# Patient Record
Sex: MALE | Race: OTHER | NOT HISPANIC OR LATINO
[De-identification: names, ages, dates, MRNs, and addresses within clinical notes are randomized per-mention and may not be internally consistent; named-entity substitution may affect disease eponyms.]

---

## 2023-06-25 ENCOUNTER — TRANSCRIPTION ENCOUNTER (OUTPATIENT)
Age: 33
End: 2023-06-25

## 2023-06-25 ENCOUNTER — INPATIENT (INPATIENT)
Facility: HOSPITAL | Age: 33
LOS: 3 days | Discharge: HOME | DRG: 336 | End: 2023-06-29
Attending: STUDENT IN AN ORGANIZED HEALTH CARE EDUCATION/TRAINING PROGRAM | Admitting: STUDENT IN AN ORGANIZED HEALTH CARE EDUCATION/TRAINING PROGRAM
Payer: COMMERCIAL

## 2023-06-25 VITALS
WEIGHT: 255.07 LBS | RESPIRATION RATE: 20 BRPM | SYSTOLIC BLOOD PRESSURE: 106 MMHG | DIASTOLIC BLOOD PRESSURE: 64 MMHG | HEIGHT: 76 IN | HEART RATE: 95 BPM | OXYGEN SATURATION: 99 % | TEMPERATURE: 99 F

## 2023-06-25 LAB
ALBUMIN SERPL ELPH-MCNC: 4.5 G/DL — SIGNIFICANT CHANGE UP (ref 3.3–5)
ALP SERPL-CCNC: 53 U/L — SIGNIFICANT CHANGE UP (ref 40–120)
ALT FLD-CCNC: 28 U/L — SIGNIFICANT CHANGE UP (ref 10–45)
ANION GAP SERPL CALC-SCNC: 11 MMOL/L — SIGNIFICANT CHANGE UP (ref 5–17)
APTT BLD: 27.6 SEC — SIGNIFICANT CHANGE UP (ref 27.5–35.5)
AST SERPL-CCNC: 21 U/L — SIGNIFICANT CHANGE UP (ref 10–40)
BASOPHILS # BLD AUTO: 0.02 K/UL — SIGNIFICANT CHANGE UP (ref 0–0.2)
BASOPHILS NFR BLD AUTO: 0.1 % — SIGNIFICANT CHANGE UP (ref 0–2)
BILIRUB SERPL-MCNC: 0.6 MG/DL — SIGNIFICANT CHANGE UP (ref 0.2–1.2)
BUN SERPL-MCNC: 12 MG/DL — SIGNIFICANT CHANGE UP (ref 7–23)
CALCIUM SERPL-MCNC: 10 MG/DL — SIGNIFICANT CHANGE UP (ref 8.4–10.5)
CHLORIDE SERPL-SCNC: 100 MMOL/L — SIGNIFICANT CHANGE UP (ref 96–108)
CO2 SERPL-SCNC: 26 MMOL/L — SIGNIFICANT CHANGE UP (ref 22–31)
CREAT SERPL-MCNC: 1.1 MG/DL — SIGNIFICANT CHANGE UP (ref 0.5–1.3)
EGFR: 91 ML/MIN/1.73M2 — SIGNIFICANT CHANGE UP
EOSINOPHIL # BLD AUTO: 0.01 K/UL — SIGNIFICANT CHANGE UP (ref 0–0.5)
EOSINOPHIL NFR BLD AUTO: 0.1 % — SIGNIFICANT CHANGE UP (ref 0–6)
GLUCOSE SERPL-MCNC: 112 MG/DL — HIGH (ref 70–99)
HCT VFR BLD CALC: 42.3 % — SIGNIFICANT CHANGE UP (ref 39–50)
HGB BLD-MCNC: 13.9 G/DL — SIGNIFICANT CHANGE UP (ref 13–17)
IMM GRANULOCYTES NFR BLD AUTO: 0.4 % — SIGNIFICANT CHANGE UP (ref 0–0.9)
INR BLD: 1.16 RATIO — SIGNIFICANT CHANGE UP (ref 0.88–1.16)
LIDOCAIN IGE QN: 10 U/L — SIGNIFICANT CHANGE UP (ref 7–60)
LYMPHOCYTES # BLD AUTO: 0.75 K/UL — LOW (ref 1–3.3)
LYMPHOCYTES # BLD AUTO: 5.5 % — LOW (ref 13–44)
MCHC RBC-ENTMCNC: 27.9 PG — SIGNIFICANT CHANGE UP (ref 27–34)
MCHC RBC-ENTMCNC: 32.9 GM/DL — SIGNIFICANT CHANGE UP (ref 32–36)
MCV RBC AUTO: 84.9 FL — SIGNIFICANT CHANGE UP (ref 80–100)
MONOCYTES # BLD AUTO: 0.94 K/UL — HIGH (ref 0–0.9)
MONOCYTES NFR BLD AUTO: 6.9 % — SIGNIFICANT CHANGE UP (ref 2–14)
NEUTROPHILS # BLD AUTO: 11.79 K/UL — HIGH (ref 1.8–7.4)
NEUTROPHILS NFR BLD AUTO: 87 % — HIGH (ref 43–77)
NRBC # BLD: 0 /100 WBCS — SIGNIFICANT CHANGE UP (ref 0–0)
PLATELET # BLD AUTO: 154 K/UL — SIGNIFICANT CHANGE UP (ref 150–400)
POTASSIUM SERPL-MCNC: 4.2 MMOL/L — SIGNIFICANT CHANGE UP (ref 3.5–5.3)
POTASSIUM SERPL-SCNC: 4.2 MMOL/L — SIGNIFICANT CHANGE UP (ref 3.5–5.3)
PROT SERPL-MCNC: 7.4 G/DL — SIGNIFICANT CHANGE UP (ref 6–8.3)
PROTHROM AB SERPL-ACNC: 13.5 SEC — HIGH (ref 10.5–13.4)
RBC # BLD: 4.98 M/UL — SIGNIFICANT CHANGE UP (ref 4.2–5.8)
RBC # FLD: 12.8 % — SIGNIFICANT CHANGE UP (ref 10.3–14.5)
SODIUM SERPL-SCNC: 137 MMOL/L — SIGNIFICANT CHANGE UP (ref 135–145)
WBC # BLD: 13.57 K/UL — HIGH (ref 3.8–10.5)
WBC # FLD AUTO: 13.57 K/UL — HIGH (ref 3.8–10.5)

## 2023-06-25 PROCEDURE — 74177 CT ABD & PELVIS W/CONTRAST: CPT | Mod: 26,MA

## 2023-06-25 PROCEDURE — 99285 EMERGENCY DEPT VISIT HI MDM: CPT

## 2023-06-25 RX ORDER — ONDANSETRON 8 MG/1
4 TABLET, FILM COATED ORAL ONCE
Refills: 0 | Status: COMPLETED | OUTPATIENT
Start: 2023-06-25 | End: 2023-06-25

## 2023-06-25 RX ORDER — ACETAMINOPHEN 500 MG
1000 TABLET ORAL ONCE
Refills: 0 | Status: COMPLETED | OUTPATIENT
Start: 2023-06-25 | End: 2023-06-25

## 2023-06-25 RX ADMIN — Medication 400 MILLIGRAM(S): at 23:21

## 2023-06-25 RX ADMIN — ONDANSETRON 4 MILLIGRAM(S): 8 TABLET, FILM COATED ORAL at 23:21

## 2023-06-25 NOTE — ED PROVIDER NOTE - PROGRESS NOTE DETAILS
Dr. Alves's Note: CT shows acute appendicitis without complication.  Surgery was consulted and they evaluated the patient and will admit to their service for surgical management of acute appendicitis.  Patient received a dose of IV ertapenem.

## 2023-06-25 NOTE — ED ADULT NURSE NOTE - OBJECTIVE STATEMENT
Pt 33 y/o male, AxOX3, presents to ED from home complaining of lower abdominal pain x 1 day. Pt reporting normal BM today, then developing lower abdominal pain w/ nausea and 1 episode of vomiting. Had abdominal discomfort recently, treated with abx for "inflamed appendicitis". Pt is well appearing, speaking full sentences without difficulty. Breathing spontaneous and unlabored. Upon assessment, abdomen soft and tender to RLQ and suprapubic area, +strong peripheral pulses, moving all extremities without difficulty, lungs clear. Safety and comfort measures initiated- bed placed in lowest position and side rails raised. Pt oriented to call bell system. Pt 33 y/o male, AxOX3, presents to ED from home complaining of lower abdominal pain x 1 day. Pt reporting normal BM today, then developing lower abdominal pain w/ nausea and 1 episode of vomiting. Had abdominal discomfort recently, treated with abx for "inflamed appendicitis". Pt is well appearing, speaking full sentences without difficulty. Breathing spontaneous and unlabored. Upon assessment, abdomen soft and tender to RLQ and suprapubic area, +strong peripheral pulses, moving all extremities without difficulty, lungs clear. Safety and comfort measures initiated- bed placed in lowest position and side rails raised. Pt oriented to call bell system. pt states last PO intake was at about 1600 on 6/25.

## 2023-06-25 NOTE — ED ADULT TRIAGE NOTE - CHIEF COMPLAINT QUOTE
lower abdominal pain starting this morning lower abdominal pain starting this morning  hx appendicitis this month, treated with antibiotics

## 2023-06-25 NOTE — ED PROVIDER NOTE - ATTENDING CONTRIBUTION TO CARE
I, Ana Naqvi, performed a history and physical exam of the patient and discussed their management with the resident, student, and/or fellow. I reviewed the note and agree with the documented findings and plan of care. I was present and available for all procedures.

## 2023-06-26 ENCOUNTER — RESULT REVIEW (OUTPATIENT)
Age: 33
End: 2023-06-26

## 2023-06-26 DIAGNOSIS — K35.80 UNSPECIFIED ACUTE APPENDICITIS: ICD-10-CM

## 2023-06-26 LAB
BLD GP AB SCN SERPL QL: NEGATIVE — SIGNIFICANT CHANGE UP
RH IG SCN BLD-IMP: POSITIVE — SIGNIFICANT CHANGE UP

## 2023-06-26 PROCEDURE — 71046 X-RAY EXAM CHEST 2 VIEWS: CPT | Mod: 26

## 2023-06-26 PROCEDURE — 44970 LAPAROSCOPY APPENDECTOMY: CPT

## 2023-06-26 PROCEDURE — 88304 TISSUE EXAM BY PATHOLOGIST: CPT | Mod: 26

## 2023-06-26 DEVICE — STAPLER COVIDIEN TRI-STAPLE 45MM PURPLE RELOAD: Type: IMPLANTABLE DEVICE | Site: ABDOMINAL | Status: FUNCTIONAL

## 2023-06-26 RX ORDER — ERTAPENEM SODIUM 1 G/1
1000 INJECTION, POWDER, LYOPHILIZED, FOR SOLUTION INTRAMUSCULAR; INTRAVENOUS ONCE
Refills: 0 | Status: COMPLETED | OUTPATIENT
Start: 2023-06-26 | End: 2023-06-26

## 2023-06-26 RX ORDER — KETOROLAC TROMETHAMINE 30 MG/ML
30 SYRINGE (ML) INJECTION ONCE
Refills: 0 | Status: DISCONTINUED | OUTPATIENT
Start: 2023-06-26 | End: 2023-06-26

## 2023-06-26 RX ORDER — HYDROMORPHONE HYDROCHLORIDE 2 MG/ML
0.5 INJECTION INTRAMUSCULAR; INTRAVENOUS; SUBCUTANEOUS ONCE
Refills: 0 | Status: DISCONTINUED | OUTPATIENT
Start: 2023-06-26 | End: 2023-06-26

## 2023-06-26 RX ORDER — ONDANSETRON 8 MG/1
4 TABLET, FILM COATED ORAL ONCE
Refills: 0 | Status: DISCONTINUED | OUTPATIENT
Start: 2023-06-26 | End: 2023-06-29

## 2023-06-26 RX ORDER — ACETAMINOPHEN 500 MG
1000 TABLET ORAL EVERY 6 HOURS
Refills: 0 | Status: DISCONTINUED | OUTPATIENT
Start: 2023-06-26 | End: 2023-06-26

## 2023-06-26 RX ORDER — SODIUM CHLORIDE 9 MG/ML
1000 INJECTION, SOLUTION INTRAVENOUS
Refills: 0 | Status: DISCONTINUED | OUTPATIENT
Start: 2023-06-26 | End: 2023-06-27

## 2023-06-26 RX ORDER — ONDANSETRON 8 MG/1
4 TABLET, FILM COATED ORAL ONCE
Refills: 0 | Status: DISCONTINUED | OUTPATIENT
Start: 2023-06-26 | End: 2023-06-26

## 2023-06-26 RX ORDER — ERTAPENEM SODIUM 1 G/1
1000 INJECTION, POWDER, LYOPHILIZED, FOR SOLUTION INTRAMUSCULAR; INTRAVENOUS EVERY 24 HOURS
Refills: 0 | Status: DISCONTINUED | OUTPATIENT
Start: 2023-06-27 | End: 2023-06-28

## 2023-06-26 RX ORDER — SODIUM CHLORIDE 9 MG/ML
1000 INJECTION, SOLUTION INTRAVENOUS
Refills: 0 | Status: DISCONTINUED | OUTPATIENT
Start: 2023-06-26 | End: 2023-06-26

## 2023-06-26 RX ORDER — HYDROMORPHONE HYDROCHLORIDE 2 MG/ML
0.5 INJECTION INTRAMUSCULAR; INTRAVENOUS; SUBCUTANEOUS
Refills: 0 | Status: DISCONTINUED | OUTPATIENT
Start: 2023-06-26 | End: 2023-06-26

## 2023-06-26 RX ORDER — ENOXAPARIN SODIUM 100 MG/ML
40 INJECTION SUBCUTANEOUS EVERY 24 HOURS
Refills: 0 | Status: DISCONTINUED | OUTPATIENT
Start: 2023-06-27 | End: 2023-06-29

## 2023-06-26 RX ORDER — KETOROLAC TROMETHAMINE 30 MG/ML
15 SYRINGE (ML) INJECTION ONCE
Refills: 0 | Status: DISCONTINUED | OUTPATIENT
Start: 2023-06-26 | End: 2023-06-26

## 2023-06-26 RX ORDER — KETOROLAC TROMETHAMINE 30 MG/ML
30 SYRINGE (ML) INJECTION EVERY 6 HOURS
Refills: 0 | Status: DISCONTINUED | OUTPATIENT
Start: 2023-06-26 | End: 2023-06-28

## 2023-06-26 RX ORDER — ACETAMINOPHEN 500 MG
1000 TABLET ORAL EVERY 6 HOURS
Refills: 0 | Status: COMPLETED | OUTPATIENT
Start: 2023-06-26 | End: 2023-06-27

## 2023-06-26 RX ADMIN — ERTAPENEM SODIUM 120 MILLIGRAM(S): 1 INJECTION, POWDER, LYOPHILIZED, FOR SOLUTION INTRAMUSCULAR; INTRAVENOUS at 03:32

## 2023-06-26 RX ADMIN — HYDROMORPHONE HYDROCHLORIDE 0.5 MILLIGRAM(S): 2 INJECTION INTRAMUSCULAR; INTRAVENOUS; SUBCUTANEOUS at 14:43

## 2023-06-26 RX ADMIN — Medication 400 MILLIGRAM(S): at 11:21

## 2023-06-26 RX ADMIN — HYDROMORPHONE HYDROCHLORIDE 0.5 MILLIGRAM(S): 2 INJECTION INTRAMUSCULAR; INTRAVENOUS; SUBCUTANEOUS at 08:52

## 2023-06-26 RX ADMIN — Medication 1000 MILLIGRAM(S): at 11:15

## 2023-06-26 RX ADMIN — Medication 400 MILLIGRAM(S): at 05:54

## 2023-06-26 RX ADMIN — Medication 15 MILLIGRAM(S): at 01:17

## 2023-06-26 RX ADMIN — Medication 1000 MILLIGRAM(S): at 00:25

## 2023-06-26 RX ADMIN — SODIUM CHLORIDE 125 MILLILITER(S): 9 INJECTION, SOLUTION INTRAVENOUS at 14:43

## 2023-06-26 RX ADMIN — SODIUM CHLORIDE 125 MILLILITER(S): 9 INJECTION, SOLUTION INTRAVENOUS at 05:53

## 2023-06-26 RX ADMIN — Medication 30 MILLIGRAM(S): at 13:39

## 2023-06-26 RX ADMIN — SODIUM CHLORIDE 125 MILLILITER(S): 9 INJECTION, SOLUTION INTRAVENOUS at 19:00

## 2023-06-26 RX ADMIN — Medication 1000 MILLIGRAM(S): at 06:24

## 2023-06-26 RX ADMIN — SODIUM CHLORIDE 125 MILLILITER(S): 9 INJECTION, SOLUTION INTRAVENOUS at 08:52

## 2023-06-26 NOTE — H&P ADULT - HISTORY OF PRESENT ILLNESS
HPI:  32M no PMH p/w abdominal pain since AM. Pain in bilateral lower quadrants, now localized to right side. Denies fevers/chills. Last ate an orange around noon and had one episode of emesis. Of note was treated for appendicitis at OSH one month ago and d/izabel home with oral antibiotics. In ED, patient hemodynamically stable, afebrile, with leukocytosis to 13. CTAP showing appendicitis with 1.2cm stool ball and dilated appendiceal tip 1.7cm with mild fat stranding and free fluid, no perforation or abscess.       PAST MEDICAL & SURGICAL HISTORY:  None    Allergies    No Known Allergies    Intolerances            Physical Exam:  General: NAD, resting comfortably  HEENT: NC/AT, EOMI, normal hearing  Pulmonary: normal resp effort  Cardiovascular: RRR  Abdominal: soft, ND, TTP in RLQ, no organomegaly, no rebound or guarding   Extremities: WWP, normal strength, no clubbing/cyanosis/edema  Neuro: A/O x 3, CNs II-XII grossly intact, normal sensation, no focal deficits    Vital Signs Last 24 Hrs  T(C): 36.8 (26 Jun 2023 03:35), Max: 37.7 (25 Jun 2023 23:25)  T(F): 98.2 (26 Jun 2023 03:35), Max: 99.9 (25 Jun 2023 23:25)  HR: 93 (26 Jun 2023 03:35) (88 - 95)  BP: 116/67 (26 Jun 2023 03:35) (106/64 - 127/76)  BP(mean): --  RR: 16 (26 Jun 2023 03:35) (16 - 20)  SpO2: 96% (26 Jun 2023 03:35) (96% - 99%)    Parameters below as of 26 Jun 2023 03:35  Patient On (Oxygen Delivery Method): room air        I&O's Summary          LABS:                        13.9   13.57 )-----------( 154      ( 25 Jun 2023 23:32 )             42.3     06-25    137  |  100  |  12  ----------------------------<  112<H>  4.2   |  26  |  1.10    Ca    10.0      25 Jun 2023 23:32    TPro  7.4  /  Alb  4.5  /  TBili  0.6  /  DBili  x   /  AST  21  /  ALT  28  /  AlkPhos  53  06-25    PT/INR - ( 25 Jun 2023 23:32 )   PT: 13.5 sec;   INR: 1.16 ratio         PTT - ( 25 Jun 2023 23:32 )  PTT:27.6 sec  Urinalysis Basic - ( 25 Jun 2023 23:32 )    Color: x / Appearance: x / SG: x / pH: x  Gluc: 112 mg/dL / Ketone: x  / Bili: x / Urobili: x   Blood: x / Protein: x / Nitrite: x   Leuk Esterase: x / RBC: x / WBC x   Sq Epi: x / Non Sq Epi: x / Bacteria: x      CAPILLARY BLOOD GLUCOSE        LIVER FUNCTIONS - ( 25 Jun 2023 23:32 )  Alb: 4.5 g/dL / Pro: 7.4 g/dL / ALK PHOS: 53 U/L / ALT: 28 U/L / AST: 21 U/L / GGT: x             Cultures:      RADIOLOGY & ADDITIONAL STUDIES:  < from: CT Abdomen and Pelvis w/ IV Cont (06.25.23 @ 23:54) >    IMPRESSION:  Acute appendicitis, with obstruction caused by 1.2 cm stool ball in the   mid appendix.  The distal appendix is dilated to 1.7 cm.  Mild   periappendicealfat stranding and trace periappendiceal free fluid.  No   evidence of perforation or abscess/drainable fluid collection..    < end of copied text >

## 2023-06-26 NOTE — BRIEF OPERATIVE NOTE - OPERATION/FINDINGS
purulence noted in the entire abdomen upon entry. The base of the appendix was identified, and transected at the base with an EndoGIA 60mm purple load. The mesentery was divided with the ligasure. Multiple pockets of purulent fluid were found and aspirated/irrigated. A drain was left in the RLQ.

## 2023-06-26 NOTE — H&P ADULT - ATTENDING COMMENTS
ATTENDING ATTESTATION  I have seen and examined this patient on rounds thismorning with the surgery team. I have reviewed all new labs, imaging and reports. I have participated in formulating the plan for the day, and have read and agree with the history, ROS, exam, assessment and plan as stated above.     Dx: acute uncomplicated appendicitis.   Planned for OR today for lap appy. Booked as add on, delayed overnight for other emergent cases. Still pending OR time today.     Carolina Hillman M.D., M.S.  Dept of Trauma, Acute and Critical Care

## 2023-06-26 NOTE — H&P ADULT - ASSESSMENT
32M no PMH/PSH p/w abdominal pain since AM, found to have appendicitis with 1.2cm stool ball and dilated appendiceal tip 1.7cm with mild fat stranding and free fluid, no perforation or abscess.     Plan:  - Admit to [facility], under Dr. Angelika Cook, floor bed  - Added on, consented for laparoscopic appendectomy, possible open  - NPO/IVF  - IV abx, ertapenem in ED  - Pain control PRN     D/w Dr. Joey Mckeon, PGY-2  [facility]  [phone]

## 2023-06-26 NOTE — PRE-ANESTHESIA EVALUATION ADULT - NSANTHOSAYNRD_GEN_A_CORE
No. SIS screening performed.  STOP BANG Legend: 0-2 = LOW Risk; 3-4 = INTERMEDIATE Risk; 5-8 = HIGH Risk

## 2023-06-26 NOTE — CHART NOTE - NSCHARTNOTEFT_GEN_A_CORE
Post Operative Note  Patient: EDWARD PRATER 32y (1990) Male   MRN: [id number]  Location: [facility] 2MON 210 D1  Visit: 06-26-23 Inpatient  Date: 06-26-23 @ 22:56    Procedure: S/P ***    Subjective: Patient seen and examined post operatively. Reports pain as controlled. Denies nausea, vomiting, fever, chills, chest pain, SOB, cough.      Objective:  Vitals: T(F): 99 (06-26-23 @ 22:15), Max: 99.9 (06-25-23 @ 23:25)  HR: 79 (06-26-23 @ 22:15)  BP: 113/70 (06-26-23 @ 22:15) (103/51 - 127/76)  RR: 18 (06-26-23 @ 22:15)  SpO2: 95% (06-26-23 @ 22:15)  Vent Settings:     In:   06-26-23 @ 07:01  -  06-26-23 @ 22:56  --------------------------------------------------------  IN: 0 mL      IV Fluids: lactated ringers. 1000 milliLiter(s) (125 mL/Hr) IV Continuous <Continuous>      Out:   06-26-23 @ 07:01  -  06-26-23 @ 22:56  --------------------------------------------------------  OUT: 1100 mL      EBL:     Voided Urine:   06-26-23 @ 07:01  -  06-26-23 @ 22:56  --------------------------------------------------------  OUT: 1100 mL      Choe Catheter: yes no   Drains:   MINAL:   06-26-23 @ 07:01  -  06-26-23 @ 22:56  --------------------------------------------------------  OUT: 50 mL     ,   Chest Tube:      NG Tube:         Physical Examination:  General: NAD, resting comfortably in bed  HEENT: Normocephalic atraumatic  Respiratory: Nonlabored respirations, normal CW expansion.  Cardio: S1S2, regular rate and rhythm.  Abdomen: softly distended, appropriately tender, surgical incisions are c/d/i. NGT/OGT*  Vascular: extremities are warm and well perfused.     Imaging:  No post-op imaging studies    Assessment:  32yMale patient S/P *** for ***    Plan:  - IV Abx:   - Pain control PRN  - Diet:  - IVF  - DAJUAN Choe  - Activity:  - DVT ppx: SCD's &     Date/Time: 06-26-23 @ 22:56 Post Operative Note  Patient: EDWARD PRATER 32y (1990) Male   MRN: [id number]  Location: [facility] 2MON 210 D1  Visit: 06-26-23 Inpatient  Date: 06-26-23 @ 22:56    Procedure: S/P lap appendectomy, found to be perforated intraop.     Subjective: Patient seen and examined post operatively. Reports pain as controlled. Denies nausea, vomiting, fever, chills, chest pain, SOB, cough.      Objective:  Vitals: T(F): 99 (06-26-23 @ 22:15), Max: 99.9 (06-25-23 @ 23:25)  HR: 79 (06-26-23 @ 22:15)  BP: 113/70 (06-26-23 @ 22:15) (103/51 - 127/76)  RR: 18 (06-26-23 @ 22:15)  SpO2: 95% (06-26-23 @ 22:15)  Vent Settings:     In:   06-26-23 @ 07:01  -  06-26-23 @ 22:56  --------------------------------------------------------  IN: 0 mL      IV Fluids: lactated ringers. 1000 milliLiter(s) (125 mL/Hr) IV Continuous <Continuous>      Out:   06-26-23 @ 07:01  -  06-26-23 @ 22:56  --------------------------------------------------------  OUT: 1100 mL      EBL:     Voided Urine:   06-26-23 @ 07:01  -  06-26-23 @ 22:56  --------------------------------------------------------  OUT: 1100 mL      MINAL:   06-26-23 @ 07:01  -  06-26-23 @ 22:56  --------------------------------------------------------  OUT: 50 mL            Physical Examination:  General: NAD, resting comfortably in bed  HEENT: Normocephalic atraumatic  Respiratory: Nonlabored respirations, normal CW expansion.  Cardio: S1S2, regular rate and rhythm.  Abdomen: softly distended, appropriately tender, surgical incisions are c/d/i. MINAL w ss output  Vascular: extremities are warm and well perfused.     Imaging:  No post-op imaging studies    Assessment:  32yMale patient S/P lap appendectomy for perforated appendicitis    Plan:  - IV Abx: ertapenem  - Pain control PRN  - Diet: npo w sips  - IVF  - mehta out at end of case, passed tov  - Activity: OOB  - DVT ppx: SCD's & lovenox    Date/Time: 06-26-23 @ 22:56

## 2023-06-27 ENCOUNTER — TRANSCRIPTION ENCOUNTER (OUTPATIENT)
Age: 33
End: 2023-06-27

## 2023-06-27 LAB
ANION GAP SERPL CALC-SCNC: 13 MMOL/L — SIGNIFICANT CHANGE UP (ref 5–17)
BUN SERPL-MCNC: 18 MG/DL — SIGNIFICANT CHANGE UP (ref 7–23)
CALCIUM SERPL-MCNC: 9.2 MG/DL — SIGNIFICANT CHANGE UP (ref 8.4–10.5)
CHLORIDE SERPL-SCNC: 101 MMOL/L — SIGNIFICANT CHANGE UP (ref 96–108)
CO2 SERPL-SCNC: 24 MMOL/L — SIGNIFICANT CHANGE UP (ref 22–31)
CREAT SERPL-MCNC: 1.2 MG/DL — SIGNIFICANT CHANGE UP (ref 0.5–1.3)
EGFR: 82 ML/MIN/1.73M2 — SIGNIFICANT CHANGE UP
GLUCOSE SERPL-MCNC: 110 MG/DL — HIGH (ref 70–99)
GRAM STN FLD: SIGNIFICANT CHANGE UP
HCT VFR BLD CALC: 40.9 % — SIGNIFICANT CHANGE UP (ref 39–50)
HGB BLD-MCNC: 13.3 G/DL — SIGNIFICANT CHANGE UP (ref 13–17)
MAGNESIUM SERPL-MCNC: 1.6 MG/DL — SIGNIFICANT CHANGE UP (ref 1.6–2.6)
MCHC RBC-ENTMCNC: 28 PG — SIGNIFICANT CHANGE UP (ref 27–34)
MCHC RBC-ENTMCNC: 32.5 GM/DL — SIGNIFICANT CHANGE UP (ref 32–36)
MCV RBC AUTO: 86.1 FL — SIGNIFICANT CHANGE UP (ref 80–100)
NRBC # BLD: 0 /100 WBCS — SIGNIFICANT CHANGE UP (ref 0–0)
PHOSPHATE SERPL-MCNC: 3.7 MG/DL — SIGNIFICANT CHANGE UP (ref 2.5–4.5)
PLATELET # BLD AUTO: 131 K/UL — LOW (ref 150–400)
POTASSIUM SERPL-MCNC: 4 MMOL/L — SIGNIFICANT CHANGE UP (ref 3.5–5.3)
POTASSIUM SERPL-SCNC: 4 MMOL/L — SIGNIFICANT CHANGE UP (ref 3.5–5.3)
RBC # BLD: 4.75 M/UL — SIGNIFICANT CHANGE UP (ref 4.2–5.8)
RBC # FLD: 13.2 % — SIGNIFICANT CHANGE UP (ref 10.3–14.5)
SODIUM SERPL-SCNC: 138 MMOL/L — SIGNIFICANT CHANGE UP (ref 135–145)
SPECIMEN SOURCE: SIGNIFICANT CHANGE UP
WBC # BLD: 12.29 K/UL — HIGH (ref 3.8–10.5)
WBC # FLD AUTO: 12.29 K/UL — HIGH (ref 3.8–10.5)

## 2023-06-27 RX ORDER — MAGNESIUM SULFATE 500 MG/ML
2 VIAL (ML) INJECTION ONCE
Refills: 0 | Status: COMPLETED | OUTPATIENT
Start: 2023-06-27 | End: 2023-06-27

## 2023-06-27 RX ORDER — HYDROMORPHONE HYDROCHLORIDE 2 MG/ML
0.5 INJECTION INTRAMUSCULAR; INTRAVENOUS; SUBCUTANEOUS ONCE
Refills: 0 | Status: DISCONTINUED | OUTPATIENT
Start: 2023-06-27 | End: 2023-06-27

## 2023-06-27 RX ADMIN — Medication 400 MILLIGRAM(S): at 20:39

## 2023-06-27 RX ADMIN — Medication 1000 MILLIGRAM(S): at 06:57

## 2023-06-27 RX ADMIN — Medication 25 GRAM(S): at 14:04

## 2023-06-27 RX ADMIN — Medication 1000 MILLIGRAM(S): at 21:09

## 2023-06-27 RX ADMIN — Medication 1000 MILLIGRAM(S): at 14:30

## 2023-06-27 RX ADMIN — Medication 400 MILLIGRAM(S): at 06:27

## 2023-06-27 RX ADMIN — Medication 400 MILLIGRAM(S): at 01:05

## 2023-06-27 RX ADMIN — Medication 30 MILLIGRAM(S): at 01:35

## 2023-06-27 RX ADMIN — Medication 30 MILLIGRAM(S): at 18:35

## 2023-06-27 RX ADMIN — Medication 30 MILLIGRAM(S): at 11:12

## 2023-06-27 RX ADMIN — Medication 30 MILLIGRAM(S): at 19:00

## 2023-06-27 RX ADMIN — ERTAPENEM SODIUM 120 MILLIGRAM(S): 1 INJECTION, POWDER, LYOPHILIZED, FOR SOLUTION INTRAMUSCULAR; INTRAVENOUS at 01:05

## 2023-06-27 RX ADMIN — Medication 30 MILLIGRAM(S): at 23:30

## 2023-06-27 RX ADMIN — Medication 30 MILLIGRAM(S): at 06:57

## 2023-06-27 RX ADMIN — Medication 30 MILLIGRAM(S): at 06:27

## 2023-06-27 RX ADMIN — Medication 30 MILLIGRAM(S): at 01:05

## 2023-06-27 RX ADMIN — Medication 1000 MILLIGRAM(S): at 01:35

## 2023-06-27 RX ADMIN — Medication 400 MILLIGRAM(S): at 14:01

## 2023-06-27 RX ADMIN — Medication 30 MILLIGRAM(S): at 11:45

## 2023-06-27 NOTE — DISCHARGE NOTE PROVIDER - NSDCMRMEDTOKEN_GEN_ALL_CORE_FT
amoxicillin-clavulanate 875 mg-125 mg oral tablet: 1 tab(s) orally every 12 hours MDD: 6  oxyCODONE 5 mg oral tablet: 1 tab(s) orally every 6 hours as needed for Severe Pain (7 - 10) as needed for pain moderate to severe MDD: 6

## 2023-06-27 NOTE — PROVIDER CONTACT NOTE (OTHER) - ACTION/TREATMENT ORDERED:
Sasha Nevarez MD notified and stated that loose stools is not unexpected after lap appendectomy and that it is likely just loose stools.

## 2023-06-27 NOTE — DISCHARGE NOTE PROVIDER - CARE PROVIDER_API CALL
Carolina Hillman  Surgical Critical Care  [address] [place] [address], [address]  [place] [address]  Phone: [phone]  Fax: [phone]  Follow Up Time: 2 weeks

## 2023-06-27 NOTE — DISCHARGE NOTE PROVIDER - HOSPITAL COURSE
32M no PMH p/w abdominal pain since AM. Pain in bilateral lower quadrants, now localized to right side. Denies fevers/chills. Last ate an orange around noon and had one episode of emesis. Of note was treated for appendicitis at OSH one month ago and d/izabel home with oral antibiotics. In ED, patient hemodynamically stable, afebrile, with leukocytosis to 13. CTAP showing appendicitis with 1.2cm stool ball and dilated appendiceal tip 1.7cm with mild fat stranding and free fluid, no perforation or abscess.     Patient admitted to the trauma service. Patient taken to the OR on 6/26 for a laparoscopic appendectomy for perforated appendicitis. Patient continued on IV antibiotics post-operatively. Diet was advanced as tolerated. Patient discharged home on oral antibiotics.    32M no PMH p/w abdominal pain since AM. Pain in bilateral lower quadrants, now localized to right side. Denies fevers/chills. Last ate an orange around noon and had one episode of emesis. Of note was treated for appendicitis at OSH one month ago and d/izabel home with oral antibiotics. In ED, patient hemodynamically stable, afebrile, with leukocytosis to 13. CTAP showing appendicitis with 1.2cm stool ball and dilated appendiceal tip 1.7cm with mild fat stranding and free fluid, no perforation or abscess.     Patient admitted to the trauma service. Patient taken to the OR on 6/26 for a laparoscopic appendectomy for perforated appendicitis. Patient continued on IV antibiotics post-operatively. Diet was advanced as tolerated. Patient discharged home on oral antibiotics. Complete 7 days. Patient to follow up with Dr. Hillman in 1-2 weeks.

## 2023-06-27 NOTE — DISCHARGE NOTE PROVIDER - NSDCCPCAREPLAN_GEN_ALL_CORE_FT
PRINCIPAL DISCHARGE DIAGNOSIS  Diagnosis: Acute appendicitis  Assessment and Plan of Treatment: Recovering from surgery  WOUND CARE: You will be discharged with MINAL drains. You will need to empty them and record outputs accurately. This will be taught to you by the nursing staff. Please do not remove the MINAL drains. They will be removed in the office. Please bring to the office accurate records of output.  Steri-strips have been applied to your incisions.  Do not remove these.  They will fall off as they get wet; in approximately 7-10 days.  BATHING: Please do not submerge wound underwater. Do not take a bath. You may shower and/or sponge bathe.  ACTIVITY: No heavy lifting or straining; do not lift anything greater than 10 pounds. Otherwise, you may return to your usual level of physical activity. If you are taking narcotic pain medication (such as Percocet), do NOT drive a car, operate machinery or make important decisions.  DIET: Return to your usual diet.  NOTIFY YOUR SURGEON IF: You have any bleeding that does not stop, any pus draining from your wound, any fever (over 100.4 F) or chills, persistent nausea/vomiting, persistent diarrhea, or if your pain is not controlled on your discharge pain medications.  FOLLOW-UP:  1. Please call to make a follow-up appointment within one week of discharge with Dr. Hillman.   2. Please follow up with your primary care physician in one week regarding your hospitalization.

## 2023-06-28 LAB
-  AMIKACIN: SIGNIFICANT CHANGE UP
-  AMOXICILLIN/CLAVULANIC ACID: SIGNIFICANT CHANGE UP
-  AMPICILLIN/SULBACTAM: SIGNIFICANT CHANGE UP
-  AMPICILLIN: SIGNIFICANT CHANGE UP
-  AZTREONAM: SIGNIFICANT CHANGE UP
-  CEFAZOLIN: SIGNIFICANT CHANGE UP
-  CEFEPIME: SIGNIFICANT CHANGE UP
-  CEFOXITIN: SIGNIFICANT CHANGE UP
-  CEFTRIAXONE: SIGNIFICANT CHANGE UP
-  CIPROFLOXACIN: SIGNIFICANT CHANGE UP
-  ERTAPENEM: SIGNIFICANT CHANGE UP
-  GENTAMICIN: SIGNIFICANT CHANGE UP
-  IMIPENEM: SIGNIFICANT CHANGE UP
-  LEVOFLOXACIN: SIGNIFICANT CHANGE UP
-  MEROPENEM: SIGNIFICANT CHANGE UP
-  PIPERACILLIN/TAZOBACTAM: SIGNIFICANT CHANGE UP
-  TOBRAMYCIN: SIGNIFICANT CHANGE UP
-  TRIMETHOPRIM/SULFAMETHOXAZOLE: SIGNIFICANT CHANGE UP
ANION GAP SERPL CALC-SCNC: 10 MMOL/L — SIGNIFICANT CHANGE UP (ref 5–17)
BUN SERPL-MCNC: 20 MG/DL — SIGNIFICANT CHANGE UP (ref 7–23)
CALCIUM SERPL-MCNC: 9 MG/DL — SIGNIFICANT CHANGE UP (ref 8.4–10.5)
CHLORIDE SERPL-SCNC: 101 MMOL/L — SIGNIFICANT CHANGE UP (ref 96–108)
CO2 SERPL-SCNC: 25 MMOL/L — SIGNIFICANT CHANGE UP (ref 22–31)
CREAT SERPL-MCNC: 1.2 MG/DL — SIGNIFICANT CHANGE UP (ref 0.5–1.3)
EGFR: 82 ML/MIN/1.73M2 — SIGNIFICANT CHANGE UP
GLUCOSE SERPL-MCNC: 89 MG/DL — SIGNIFICANT CHANGE UP (ref 70–99)
HCT VFR BLD CALC: 37.4 % — LOW (ref 39–50)
HGB BLD-MCNC: 12.2 G/DL — LOW (ref 13–17)
MAGNESIUM SERPL-MCNC: 1.8 MG/DL — SIGNIFICANT CHANGE UP (ref 1.6–2.6)
MCHC RBC-ENTMCNC: 28 PG — SIGNIFICANT CHANGE UP (ref 27–34)
MCHC RBC-ENTMCNC: 32.6 GM/DL — SIGNIFICANT CHANGE UP (ref 32–36)
MCV RBC AUTO: 86 FL — SIGNIFICANT CHANGE UP (ref 80–100)
METHOD TYPE: SIGNIFICANT CHANGE UP
NRBC # BLD: 0 /100 WBCS — SIGNIFICANT CHANGE UP (ref 0–0)
PHOSPHATE SERPL-MCNC: 2.4 MG/DL — LOW (ref 2.5–4.5)
PLATELET # BLD AUTO: 128 K/UL — LOW (ref 150–400)
POTASSIUM SERPL-MCNC: 4 MMOL/L — SIGNIFICANT CHANGE UP (ref 3.5–5.3)
POTASSIUM SERPL-SCNC: 4 MMOL/L — SIGNIFICANT CHANGE UP (ref 3.5–5.3)
RBC # BLD: 4.35 M/UL — SIGNIFICANT CHANGE UP (ref 4.2–5.8)
RBC # FLD: 13.2 % — SIGNIFICANT CHANGE UP (ref 10.3–14.5)
SODIUM SERPL-SCNC: 136 MMOL/L — SIGNIFICANT CHANGE UP (ref 135–145)
WBC # BLD: 8.37 K/UL — SIGNIFICANT CHANGE UP (ref 3.8–10.5)
WBC # FLD AUTO: 8.37 K/UL — SIGNIFICANT CHANGE UP (ref 3.8–10.5)

## 2023-06-28 RX ORDER — ACETAMINOPHEN 500 MG
975 TABLET ORAL EVERY 6 HOURS
Refills: 0 | Status: DISCONTINUED | OUTPATIENT
Start: 2023-06-28 | End: 2023-06-29

## 2023-06-28 RX ORDER — MAGNESIUM SULFATE 500 MG/ML
2 VIAL (ML) INJECTION ONCE
Refills: 0 | Status: COMPLETED | OUTPATIENT
Start: 2023-06-28 | End: 2023-06-28

## 2023-06-28 RX ORDER — OXYCODONE HYDROCHLORIDE 5 MG/1
5 TABLET ORAL EVERY 6 HOURS
Refills: 0 | Status: DISCONTINUED | OUTPATIENT
Start: 2023-06-28 | End: 2023-06-29

## 2023-06-28 RX ORDER — OXYCODONE HYDROCHLORIDE 5 MG/1
2.5 TABLET ORAL EVERY 4 HOURS
Refills: 0 | Status: DISCONTINUED | OUTPATIENT
Start: 2023-06-28 | End: 2023-06-29

## 2023-06-28 RX ORDER — SODIUM,POTASSIUM PHOSPHATES 278-250MG
1 POWDER IN PACKET (EA) ORAL ONCE
Refills: 0 | Status: COMPLETED | OUTPATIENT
Start: 2023-06-28 | End: 2023-06-28

## 2023-06-28 RX ADMIN — Medication 30 MILLIGRAM(S): at 06:20

## 2023-06-28 RX ADMIN — Medication 1 TABLET(S): at 11:01

## 2023-06-28 RX ADMIN — ERTAPENEM SODIUM 120 MILLIGRAM(S): 1 INJECTION, POWDER, LYOPHILIZED, FOR SOLUTION INTRAMUSCULAR; INTRAVENOUS at 01:51

## 2023-06-28 RX ADMIN — Medication 30 MILLIGRAM(S): at 05:50

## 2023-06-28 RX ADMIN — Medication 975 MILLIGRAM(S): at 18:44

## 2023-06-28 RX ADMIN — Medication 975 MILLIGRAM(S): at 13:12

## 2023-06-28 RX ADMIN — Medication 85 MILLIMOLE(S): at 13:15

## 2023-06-28 RX ADMIN — Medication 25 GRAM(S): at 11:20

## 2023-06-28 RX ADMIN — Medication 1 PACKET(S): at 17:45

## 2023-06-28 RX ADMIN — ENOXAPARIN SODIUM 40 MILLIGRAM(S): 100 INJECTION SUBCUTANEOUS at 13:15

## 2023-06-28 RX ADMIN — Medication 1 TABLET(S): at 21:58

## 2023-06-28 RX ADMIN — Medication 975 MILLIGRAM(S): at 14:00

## 2023-06-28 RX ADMIN — Medication 975 MILLIGRAM(S): at 17:44

## 2023-06-28 RX ADMIN — Medication 30 MILLIGRAM(S): at 00:00

## 2023-06-29 ENCOUNTER — TRANSCRIPTION ENCOUNTER (OUTPATIENT)
Age: 33
End: 2023-06-29

## 2023-06-29 VITALS
HEART RATE: 72 BPM | SYSTOLIC BLOOD PRESSURE: 110 MMHG | DIASTOLIC BLOOD PRESSURE: 69 MMHG | TEMPERATURE: 98 F | RESPIRATION RATE: 18 BRPM | OXYGEN SATURATION: 99 %

## 2023-06-29 LAB
HCT VFR BLD CALC: 38.5 % — LOW (ref 39–50)
HGB BLD-MCNC: 12.4 G/DL — LOW (ref 13–17)
MCHC RBC-ENTMCNC: 28.1 PG — SIGNIFICANT CHANGE UP (ref 27–34)
MCHC RBC-ENTMCNC: 32.2 GM/DL — SIGNIFICANT CHANGE UP (ref 32–36)
MCV RBC AUTO: 87.3 FL — SIGNIFICANT CHANGE UP (ref 80–100)
NRBC # BLD: 0 /100 WBCS — SIGNIFICANT CHANGE UP (ref 0–0)
PLATELET # BLD AUTO: 152 K/UL — SIGNIFICANT CHANGE UP (ref 150–400)
RBC # BLD: 4.41 M/UL — SIGNIFICANT CHANGE UP (ref 4.2–5.8)
RBC # FLD: 13.2 % — SIGNIFICANT CHANGE UP (ref 10.3–14.5)
WBC # BLD: 6.79 K/UL — SIGNIFICANT CHANGE UP (ref 3.8–10.5)
WBC # FLD AUTO: 6.79 K/UL — SIGNIFICANT CHANGE UP (ref 3.8–10.5)

## 2023-06-29 PROCEDURE — 83690 ASSAY OF LIPASE: CPT

## 2023-06-29 PROCEDURE — 85610 PROTHROMBIN TIME: CPT

## 2023-06-29 PROCEDURE — 74177 CT ABD & PELVIS W/CONTRAST: CPT | Mod: MA

## 2023-06-29 PROCEDURE — 86900 BLOOD TYPING SEROLOGIC ABO: CPT

## 2023-06-29 PROCEDURE — 93005 ELECTROCARDIOGRAM TRACING: CPT

## 2023-06-29 PROCEDURE — C9399: CPT

## 2023-06-29 PROCEDURE — 87186 SC STD MICRODIL/AGAR DIL: CPT

## 2023-06-29 PROCEDURE — 85730 THROMBOPLASTIN TIME PARTIAL: CPT

## 2023-06-29 PROCEDURE — 96375 TX/PRO/DX INJ NEW DRUG ADDON: CPT

## 2023-06-29 PROCEDURE — 88304 TISSUE EXAM BY PATHOLOGIST: CPT

## 2023-06-29 PROCEDURE — 36415 COLL VENOUS BLD VENIPUNCTURE: CPT

## 2023-06-29 PROCEDURE — 85027 COMPLETE CBC AUTOMATED: CPT

## 2023-06-29 PROCEDURE — 86901 BLOOD TYPING SEROLOGIC RH(D): CPT

## 2023-06-29 PROCEDURE — 87205 SMEAR GRAM STAIN: CPT

## 2023-06-29 PROCEDURE — 87077 CULTURE AEROBIC IDENTIFY: CPT

## 2023-06-29 PROCEDURE — 96374 THER/PROPH/DIAG INJ IV PUSH: CPT

## 2023-06-29 PROCEDURE — 84100 ASSAY OF PHOSPHORUS: CPT

## 2023-06-29 PROCEDURE — 85025 COMPLETE CBC W/AUTO DIFF WBC: CPT

## 2023-06-29 PROCEDURE — 80048 BASIC METABOLIC PNL TOTAL CA: CPT

## 2023-06-29 PROCEDURE — 99285 EMERGENCY DEPT VISIT HI MDM: CPT

## 2023-06-29 PROCEDURE — 80053 COMPREHEN METABOLIC PANEL: CPT

## 2023-06-29 PROCEDURE — 83735 ASSAY OF MAGNESIUM: CPT

## 2023-06-29 PROCEDURE — 71046 X-RAY EXAM CHEST 2 VIEWS: CPT

## 2023-06-29 PROCEDURE — 86850 RBC ANTIBODY SCREEN: CPT

## 2023-06-29 PROCEDURE — 87070 CULTURE OTHR SPECIMN AEROBIC: CPT

## 2023-06-29 PROCEDURE — C1889: CPT

## 2023-06-29 RX ORDER — OXYCODONE HYDROCHLORIDE 5 MG/1
1 TABLET ORAL
Qty: 8 | Refills: 0
Start: 2023-06-29

## 2023-06-29 RX ORDER — OXYCODONE HYDROCHLORIDE 5 MG/1
1 TABLET ORAL
Qty: 0 | Refills: 0 | DISCHARGE
Start: 2023-06-29

## 2023-06-29 RX ADMIN — Medication 975 MILLIGRAM(S): at 00:18

## 2023-06-29 RX ADMIN — Medication 975 MILLIGRAM(S): at 06:20

## 2023-06-29 RX ADMIN — Medication 975 MILLIGRAM(S): at 05:50

## 2023-06-29 RX ADMIN — Medication 975 MILLIGRAM(S): at 12:03

## 2023-06-29 RX ADMIN — Medication 975 MILLIGRAM(S): at 00:58

## 2023-06-29 RX ADMIN — Medication 975 MILLIGRAM(S): at 12:35

## 2023-06-29 RX ADMIN — Medication 1 TABLET(S): at 05:50

## 2023-06-29 NOTE — PROGRESS NOTE ADULT - ASSESSMENT
32M no PMH/PSH p/w abdominal pain since AM, found to have appendicitis with 1.2cm stool ball and dilated appendiceal tip 1.7cm with mild fat stranding and free fluid, no perforation or abscess.   - S/p lap appy with MINAL drain, notable for significant perforation (6/27)    Plan:  - NPO/IVF  - IV abx, ertapenem  - Pain control PRN     Trauma 2227  
32M no PMH/PSH p/w abdominal pain since AM, found to have appendicitis with 1.2cm stool ball and dilated appendiceal tip 1.7cm with mild fat stranding and free fluid, no perforation or abscess.   - S/p lap appy with MINAL drain, notable for significant perforation (6/27)    Plan:  - Diet: Adv to Reg diet  - D/c IV abx, ertapenem; start PO Augmentin  - Pain control PRN   - VTE ppx: Lovenox SQ      Trauma 9039  
32M no PMH/PSH p/w abdominal pain since AM, found to have appendicitis with 1.2cm stool ball and dilated appendiceal tip 1.7cm with mild fat stranding and free fluid, no perforation or abscess.   - S/p lap appy with MINAL drain, notable for significant perforation (6/27)    Plan:  - Diet: Adv to Reg diet  - On PO Augmentin  - Pain control PRN   - dc planning  - VTE ppx: Lovenox SQ    Trauma 9048

## 2023-06-29 NOTE — PROGRESS NOTE ADULT - SUBJECTIVE AND OBJECTIVE BOX
SURGERY DAILY PROGRESS NOTE    --------------- OVERNIGHT/INTERVAL EVENTS ---------------  - No acute events overnight     --------------- SUBJECTIVE ---------------  - Patient describes no acute issues or concerns at this time  - +/+ BM +void. Denies CP, SOB, n/v, abdominal pain.   - Patient seen and examined at bedside with surgical team    --------------- OBJECTIVE ---------------    Vital Signs Last 24 Hrs  T(C): 37.1 (28 Jun 2023 08:19), Max: 37.1 (28 Jun 2023 00:13)  T(F): 98.8 (28 Jun 2023 08:19), Max: 98.8 (28 Jun 2023 00:13)  HR: 80 (28 Jun 2023 08:19) (68 - 80)  BP: 106/60 (28 Jun 2023 08:19) (106/60 - 121/65)  BP(mean): --  RR: 18 (28 Jun 2023 08:19) (18 - 18)  SpO2: 97% (28 Jun 2023 08:19) (95% - 98%)    Parameters below as of 28 Jun 2023 08:19  Patient On (Oxygen Delivery Method): room air     -----PHYSICAL EXAM-----  GENERAL: NAD, lying in bed   NEURO: AOx3, awake alert appropriate  HEENT: NCAT, trachea midline  PULM: Respirations non-labored  ABD: Soft, non-tender, non-distended, no peritonitis/rebound tenderness  +MINAL drain SSF  EXT: Warm, well perfused    -----DRAINS-----  MINAL:   OUT: 60 mL       Chest Tube:      NG Tube:       -----INs & OUTs-----    I&O's Detail    27 Jun 2023 07:01  -  28 Jun 2023 07:00  --------------------------------------------------------  IN:    IV PiggyBack: 200 mL    IV PiggyBack: 50 mL    Oral Fluid: 660 mL  Total IN: 910 mL    OUT:    Bulb (mL): 60 mL  Total OUT: 60 mL    Total NET: 850 mL      28 Jun 2023 07:01  -  28 Jun 2023 12:27  --------------------------------------------------------  IN:  Total IN: 0 mL    OUT:    Bulb (mL): 20 mL    Voided (mL): 75 mL  Total OUT: 95 mL    Total NET: -95 mL      -----MEDICATIONS-----  MEDICATIONS  (STANDING):  acetaminophen     Tablet .. 975 milliGRAM(s) Oral every 6 hours  amoxicillin  875 milliGRAM(s)/clavulanate 1 Tablet(s) Oral every 12 hours  enoxaparin Injectable 40 milliGRAM(s) SubCutaneous every 24 hours  potassium phosphate / sodium phosphate Powder (PHOS-NaK) 1 Packet(s) Oral once  sodium phosphate 30 milliMole(s)/500 mL IVPB 30 milliMole(s) IV Intermittent once    MEDICATIONS  (PRN):  ondansetron Injectable 4 milliGRAM(s) IV Push once PRN Nausea and/or Vomiting  oxyCODONE    IR 2.5 milliGRAM(s) Oral every 4 hours PRN Moderate Pain (4 - 6)  oxyCODONE    IR 5 milliGRAM(s) Oral every 6 hours PRN Severe Pain (7 - 10)      -----LABS-----                        12.2   8.37  )-----------( 128      ( 28 Jun 2023 07:20 )             37.4     06-28    136  |  101  |  20  ----------------------------<  89  4.0   |  25  |  1.20    Ca    9.0      28 Jun 2023 07:20  Phos  2.4     06-28  Mg     1.8     06-28    Culture - Abscess with Gram Stain (collected 06-26)  Source: .Abscess PERITONEAL FLUID  Gram Stain (06-27):    Rare polymorphonuclear leukocytes per low power field    No organisms seen per oil power field    
  SURGERY DAILY PROGRESS NOTE    --------------- OVERNIGHT/INTERVAL EVENTS ---------------  - No acute events overnight     --------------- SUBJECTIVE ---------------  - Patient describes no acute issues or concerns at this time  - +/+ BM +void. Denies CP, SOB, n/v, abdominal pain.   - Patient seen and examined at bedside with surgical team    --------------- OBJECTIVE ---------------    -----VITALS-----  T(C): 36.9, Max: 37.4 (06-26)  HR: 73 (70 - 85)  BP: 107/65 (103/51 - 123/69)  RR: 18 (14 - 18)  SpO2: 96% (94% - 100%) room air          06-26 - 06-27  --------------------------------------------------------  IN:    IV PiggyBack: 200 mL    Lactated Ringers: 1500 mL  Total IN: 1700 mL    OUT:    Bulb (mL): 190 mL    Oral Fluid: 0 mL    Voided (mL): 1050 mL  Total OUT: 1240 mL     -----PHYSICAL EXAM-----  GENERAL: NAD, lying in bed   NEURO: AOx3, awake alert appropriate  HEENT: NCAT, trachea midline  PULM: Respirations non-labored  ABD: Soft, non-tender, non-distended, no peritonitis/rebound tenderness  +MINAL drain SSF  EXT: Warm, well perfused    -----DRAINS-----  MINAL:   OUT: 190 mL       Chest Tube:      NG Tube:       -----INs & OUTs-----    06-26 - 06-27  --------------------------------------------------------  IN:    IV PiggyBack: 200 mL    Lactated Ringers: 1500 mL  Total IN: 1700 mL    OUT:    Bulb (mL): 190 mL    Oral Fluid: 0 mL    Voided (mL): 1050 mL  Total OUT: 1240 mL          -----MEDICATIONS-----  STANDING  acetaminophen   IVPB .. 1000 milliGRAM(s) IV Intermittent every 6 hours  enoxaparin Injectable 40 milliGRAM(s) SubCutaneous every 24 hours  ertapenem  IVPB 1000 milliGRAM(s) IV Intermittent every 24 hours  ketorolac   Injectable 30 milliGRAM(s) IV Push every 6 hours  lactated ringers. 1000 milliLiter(s) (125 mL/Hr) IV Continuous <Continuous>    PRN  ondansetron Injectable 4 milliGRAM(s) IV Push once PRN Nausea and/or Vomiting      -----LABS-----  138  |  101  |  18  ----------------------------<  110<H>    (06-27)  4.0   |  24  |  1.20            Ca    9.2      06-27  Mg    1.6  Phos  3.7            Urinalysis Basic - ( 27 Jun 2023 06:57 )    Color: x / Appearance: x / SG: x / pH: x  Gluc: 110 mg/dL / Ketone: x  / Bili: x / Urobili: x   Blood: x / Protein: x / Nitrite: x   Leuk Esterase: x / RBC: x / WBC x   Sq Epi: x / Non Sq Epi: x / Bacteria: x        Culture - Abscess with Gram Stain (collected 06-26)  Source: .Abscess PERITONEAL FLUID  Gram Stain (06-27):    Rare polymorphonuclear leukocytes per low power field    No organisms seen per oil power field    
ACS SURGERY DAILY PROGRESS NOTE:     SUBJECTIVE/ROS: Patient seen and examined. He feels well. Denies abdominal pain, n/v. Tolerating diet.      MEDICATIONS  (STANDING):  acetaminophen     Tablet .. 975 milliGRAM(s) Oral every 6 hours  amoxicillin  875 milliGRAM(s)/clavulanate 1 Tablet(s) Oral every 12 hours  enoxaparin Injectable 40 milliGRAM(s) SubCutaneous every 24 hours    MEDICATIONS  (PRN):  ondansetron Injectable 4 milliGRAM(s) IV Push once PRN Nausea and/or Vomiting  oxyCODONE    IR 2.5 milliGRAM(s) Oral every 4 hours PRN Moderate Pain (4 - 6)  oxyCODONE    IR 5 milliGRAM(s) Oral every 6 hours PRN Severe Pain (7 - 10)      OBJECTIVE:    Vital Signs Last 24 Hrs  T(C): 36.7 (29 Jun 2023 05:15), Max: 37.2 (29 Jun 2023 00:10)  T(F): 98 (29 Jun 2023 05:15), Max: 98.9 (29 Jun 2023 00:10)  HR: 56 (29 Jun 2023 05:15) (56 - 80)  BP: 107/61 (29 Jun 2023 05:15) (104/63 - 112/65)  BP(mean): --  RR: 18 (29 Jun 2023 05:15) (18 - 18)  SpO2: 98% (29 Jun 2023 05:15) (94% - 98%)    Parameters below as of 29 Jun 2023 05:15  Patient On (Oxygen Delivery Method): room air            I&O's Detail    27 Jun 2023 07:01  -  28 Jun 2023 07:00  --------------------------------------------------------  IN:    IV PiggyBack: 200 mL    IV PiggyBack: 50 mL    Oral Fluid: 660 mL  Total IN: 910 mL    OUT:    Bulb (mL): 60 mL  Total OUT: 60 mL    Total NET: 850 mL      28 Jun 2023 07:01  -  29 Jun 2023 06:10  --------------------------------------------------------  IN:    IV PiggyBack: 500 mL    IV PiggyBack: 50 mL    Oral Fluid: 100 mL  Total IN: 650 mL    OUT:    Bulb (mL): 110 mL    Voided (mL): 1075 mL  Total OUT: 1185 mL    Total NET: -535 mL          Daily     Daily     LABS:                        12.2   8.37  )-----------( 128      ( 28 Jun 2023 07:20 )             37.4     06-28    136  |  101  |  20  ----------------------------<  89  4.0   |  25  |  1.20    Ca    9.0      28 Jun 2023 07:20  Phos  2.4     06-28  Mg     1.8     06-28        Urinalysis Basic - ( 28 Jun 2023 07:20 )    Color: x / Appearance: x / SG: x / pH: x  Gluc: 89 mg/dL / Ketone: x  / Bili: x / Urobili: x   Blood: x / Protein: x / Nitrite: x   Leuk Esterase: x / RBC: x / WBC x   Sq Epi: x / Non Sq Epi: x / Bacteria: x                PHYSICAL EXAM:    GENERAL: NAD, lying in bed   NEURO: AOx3, awake alert appropriate  HEENT: NCAT, trachea midline  PULM: Respirations non-labored  ABD: Soft, non-tender, non-distended, no peritonitis/rebound tenderness  +MINAL drain SSF  EXT: Warm, well perfused

## 2023-06-29 NOTE — DISCHARGE NOTE NURSING/CASE MANAGEMENT/SOCIAL WORK - PATIENT PORTAL LINK FT
You can access the FollowMyHealth Patient Portal offered by [facility] by registering at the following website: [email or web address]. By joining [facility]’s FollowMyHealth portal, you will also be able to view your health information using other applications (apps) compatible with our system.

## 2023-06-29 NOTE — PROGRESS NOTE ADULT - ATTENDING COMMENTS
ATTENDING ATTESTATION  I have seen and examined this patient on rounds thismorning with the surgery team. I have reviewed all new labs, imaging and reports. I have participated in formulating the plan for the day, and have read and agree with the history, ROS, exam, assessment and plan as stated above.     POD 1 from [facility] for perforated appendicitis with widespread purulent contamination.   MINAL drain left in RLQ/pelvis.   Plan for 4 days abx. Discussed risk for abscess formation and potential need for repeat CT scan and/or IR intervention in the future.   Pain well controlled. Having liquid stools.   OK for liquids.     Carolina Hillman M.D., M.S.  Dept of Trauma, Acute and Critical Care
ATTENDING ATTESTATION  I have seen and examined this patient on rounds thismorning with the surgery team. I have reviewed all new labs, imaging and reports. I have participated in formulating the plan for the day, and have read and agree with the history, ROS, exam, assessment and plan as stated above.     POD3 from lap appy for perf'd appy.   Doing well. WBC remains normal (6) after switching to Augmentin yesterday. Complete 7 day course given wide-spread purulence.   MINAL --> 45 serosang. Will d/c with drain and remove once output is less.   High risk for delayed abscess formation, patient is aware of warning signs.   OK for discharge home today.     Carolina Hillman M.D., M.S.  Dept of Trauma, Acute and Critical Care

## 2023-07-03 PROBLEM — Z78.9 OTHER SPECIFIED HEALTH STATUS: Chronic | Status: ACTIVE | Noted: 2023-06-26

## 2023-07-05 LAB
CULTURE RESULTS: SIGNIFICANT CHANGE UP
ORGANISM # SPEC MICROSCOPIC CNT: SIGNIFICANT CHANGE UP
ORGANISM # SPEC MICROSCOPIC CNT: SIGNIFICANT CHANGE UP
SPECIMEN SOURCE: SIGNIFICANT CHANGE UP
SURGICAL PATHOLOGY STUDY: SIGNIFICANT CHANGE UP

## 2023-07-10 ENCOUNTER — APPOINTMENT (OUTPATIENT)
Dept: TRAUMA SURGERY | Facility: CLINIC | Age: 33
End: 2023-07-10
Payer: COMMERCIAL

## 2023-07-10 PROBLEM — Z00.00 ENCOUNTER FOR PREVENTIVE HEALTH EXAMINATION: Status: ACTIVE | Noted: 2023-07-10

## 2023-07-10 PROCEDURE — 99024 POSTOP FOLLOW-UP VISIT: CPT

## 2023-07-10 NOTE — HISTORY OF PRESENT ILLNESS
[FreeTextEntry1] : 31 yo m, s/p laparoscopic appendectomy 2/2 perforated appendicitis and drain placement. No c/o. No N/V/D, no fever/chills. Nadeem PO, +BM. Drain output measured by patient, <5cc in last few days, serosanguineous.

## 2023-07-10 NOTE — PHYSICAL EXAM
[JVD] : no jugular venous distention  [Normal Breath Sounds] : Normal breath sounds [Normal Heart Sounds] : normal heart sounds [No Rash or Lesion] : No rash or lesion [Alert] : alert [Calm] : calm [de-identified] : No acute distress. [de-identified] : Soft, nontender, nondistended. Wounds C/D/I. Drain in place, minimal serosanguineous fluid.

## 2023-09-29 NOTE — ED PROVIDER NOTE - CLINICAL SUMMARY MEDICAL DECISION MAKING FREE TEXT BOX
Ana Naqvi MD (Attending Physician): 32M presents for lower abdominal pain and RLQ pain since 9am today with constipation. 1 month ago patient had constipation, went to ED at [facility] in [place] and had CT showing appendicitis. Pt placed on abx, finished course and had fu with PMD which was ok until now. Patient also has nausea, nonbilious/nonbloody emesis. No fever, chills, cp, SOB. No h/o abdominal surgeries     On exam,  Const: Cooperative, in NAD  HEENT: Head is normocephalic, atraumatic. PERRLA. EOMI. Sclera and conjunctiva normal  Lungs:clear to auscultation bilaterally. No wheezes, crackles, rhonchi   Cardiovascular: RRR, no murmurs, rubs or gallops.  Abdomen: No scars. No distension. (-)Gan's sign.                   (+)Rovsing's. (-)psoas, mcburney point, obturator   MSK: No pitting edema, erythema, or cyanosis. Full ROM in all extremities   Neuro:Awake, AOx3, follows commands    Differentials include but are not limited to: appendicitis, colitis, constipation, viral illness. Will plan for labs, CT, NPO, antimetics and analgesia.
Second degree

## (undated) DEVICE — GOWN TRIMAX LG

## (undated) DEVICE — SUT BIOSYN 4-0 18" P-12

## (undated) DEVICE — GLV 6.5 PROTEXIS (WHITE)

## (undated) DEVICE — SPECIMEN CONTAINER 100ML

## (undated) DEVICE — LIGASURE MARYLAND 37CM

## (undated) DEVICE — VENODYNE/SCD SLEEVE CALF LARGE

## (undated) DEVICE — POSITIONER FOAM EGG CRATE ULNAR 2PCS (PINK)

## (undated) DEVICE — BLADE SCALPEL SAFETYLOCK #15

## (undated) DEVICE — D HELP - CLEARVIEW CLEARIFY SYSTEM

## (undated) DEVICE — SOL IRR POUR H2O 250ML

## (undated) DEVICE — PACK ADVANCED LAPAROSCOPIC NS

## (undated) DEVICE — BLADE SCALPEL SAFETYLOCK #10

## (undated) DEVICE — DRSG TEGADERM 6"X8"

## (undated) DEVICE — DRAPE INSTRUMENT POUCH 6.75" X 11"

## (undated) DEVICE — DRAPE TOWEL BLUE 17" X 24"

## (undated) DEVICE — GLV 8.5 PROTEXIS (WHITE)

## (undated) DEVICE — MEDICATION LABELS W MARKER

## (undated) DEVICE — TROCAR APPLIED MEDICAL KII BALLOON BLUNT TIP 12MM X 100MM

## (undated) DEVICE — STAPLER COVIDIEN ENDO GIA STANDARD HANDLE

## (undated) DEVICE — TUBING INSUFFLATION LAP FILTER 10FT

## (undated) DEVICE — DRAIN JACKSON PRATT 10MM FLAT FULL NO TROCAR

## (undated) DEVICE — DRSG OPSITE 2.5 X 2"

## (undated) DEVICE — GLV 8 PROTEXIS (WHITE)

## (undated) DEVICE — SOL IRR POUR NS 0.9% 500ML

## (undated) DEVICE — ENDOCATCH 10MM SPECIMEN POUCH

## (undated) DEVICE — GLV 7 PROTEXIS (WHITE)

## (undated) DEVICE — WARMING BLANKET UPPER ADULT

## (undated) DEVICE — GLV 7.5 PROTEXIS (WHITE)

## (undated) DEVICE — TROCAR COVIDIEN VERSAPORT BLADELESS OPTICAL 5MM STANDARD

## (undated) DEVICE — PREP CHLORAPREP HI-LITE ORANGE 26ML

## (undated) DEVICE — SUT POLYSORB 0 36" GU-46

## (undated) DEVICE — DRSG STERISTRIPS 0.5 X 4"